# Patient Record
Sex: MALE | Race: WHITE | HISPANIC OR LATINO | Employment: FULL TIME | URBAN - METROPOLITAN AREA
[De-identification: names, ages, dates, MRNs, and addresses within clinical notes are randomized per-mention and may not be internally consistent; named-entity substitution may affect disease eponyms.]

---

## 2021-06-13 ENCOUNTER — HOSPITAL ENCOUNTER (EMERGENCY)
Facility: CLINIC | Age: 36
Discharge: HOME | End: 2021-06-13
Attending: EMERGENCY MEDICINE | Admitting: EMERGENCY MEDICINE

## 2021-06-13 VITALS
OXYGEN SATURATION: 99 % | DIASTOLIC BLOOD PRESSURE: 94 MMHG | HEART RATE: 98 BPM | TEMPERATURE: 99 F | SYSTOLIC BLOOD PRESSURE: 132 MMHG | WEIGHT: 183.64 LBS | RESPIRATION RATE: 14 BRPM

## 2021-06-13 DIAGNOSIS — H60.502 ACUTE OTITIS EXTERNA OF LEFT EAR, UNSPECIFIED TYPE: ICD-10-CM

## 2021-06-13 PROCEDURE — 99282 EMERGENCY DEPT VISIT SF MDM: CPT

## 2021-06-13 RX ORDER — NEOMYCIN SULFATE, POLYMYXIN B SULFATE, HYDROCORTISONE 3.5; 10000; 1 MG/ML; [USP'U]/ML; MG/ML
3 SOLUTION/ DROPS AURICULAR (OTIC) 4 TIMES DAILY
Qty: 6 ML | Refills: 0 | Status: SHIPPED | OUTPATIENT
Start: 2021-06-13 | End: 2021-06-23

## 2021-06-13 RX ORDER — CIPROFLOXACIN 500 MG/1
500 TABLET, FILM COATED ORAL 2 TIMES DAILY
Qty: 14 TABLET | Refills: 0 | Status: SHIPPED | OUTPATIENT
Start: 2021-06-13 | End: 2021-06-20

## 2021-06-13 NOTE — ED TRIAGE NOTES
A&O x4, ABCs intact. Pt presents with concern for ear infection. Pt states that his left ear is hurting and there is purulent drainage coming from it. Pt also swelling that feels that it is going into his teeth..

## 2021-06-13 NOTE — ED PROVIDER NOTES
History   Chief Complaint:  Otalgia       HPI   Yogi Scott is a 35 year old male who presents with left sided ear pain and yellow drainage since yesterday.  His right ear is normal.  He denies any trauma, fever, foreign body.  He does not swim and does not put water in excessively.  He denies any fevers or chills.  He denies any prior episodes of ear infection.    Review of Systems  Please see HPI. All other systems reviewed and negative.      Allergies:  The patient has no known allergies.     Medications:  The patient is not currently taking any prescribed medications.    Past Medical History:     No past medical history.    Social History:  Here with spouse    Physical Exam     Patient Vitals for the past 24 hrs:   BP Temp Temp src Pulse Resp SpO2 Weight   06/13/21 1616 (!) 132/94 99  F (37.2  C) Temporal 98 14 99 % 83.3 kg (183 lb 10.3 oz)       Physical Exam  Constitutional:       Appearance: He is well-developed.   HENT:      Right Ear: External ear normal.      Left Ear: Tympanic membrane normal.      Ears:      Comments: L EAC swollen with clear drainage, ear tender on exam with pain concentrated anteriorly. No pain over mastoids.      Mouth/Throat:      Mouth: Mucous membranes are moist.      Pharynx: Oropharynx is clear. No oropharyngeal exudate or posterior oropharyngeal erythema.   Eyes:      General: No scleral icterus.     Extraocular Movements: Extraocular movements intact.      Conjunctiva/sclera: Conjunctivae normal.      Pupils: Pupils are equal, round, and reactive to light.   Neck:      Musculoskeletal: Normal range of motion and neck supple.   Cardiovascular:      Rate and Rhythm: Normal rate and regular rhythm.      Heart sounds: Normal heart sounds. No murmur. No friction rub. No gallop.    Pulmonary:      Effort: Pulmonary effort is normal. No respiratory distress.      Breath sounds: Normal breath sounds. No wheezing or rales.   Lymphadenopathy:      Cervical: No cervical adenopathy.    Neurological:      Mental Status: He is alert.           Emergency Department Course     Emergency Department Course:    Reviewed:  I reviewed nursing notes and vitals    Assessments:  1717 I obtained history and examined the patient as noted above. I answered all questions prior to discharge.    Disposition:  The patient was discharged to home.       Impression & Plan       Medical Decision Making:  Yogi Scott is a 35 year old male who presents with an exam consistent with otitis externa.  There is no sign of mastoiditis, mass, dental abscess, or peritonsillar abscess. The patient will be started on ear drops and may take ibuprofen for pain.  Oral cirpo prescribed as well. Return if increasing pain, fever, hearing decrease or discharge.  Follow-up with ENT for reevaluation in 2-3 days      Diagnosis:    ICD-10-CM    1. Acute otitis externa of left ear, unspecified type  H60.502        Discharge Medications:  New Prescriptions    CIPROFLOXACIN (CIPRO) 500 MG TABLET    Take 1 tablet (500 mg) by mouth 2 times daily for 7 days    NEOMYCIN-POLYMYXIN-HYDROCORTISONE (CORTISPORIN) [id number] OTIC SOLUTION    Place 3 drops in ear(s) 4 times daily for 10 days       Scribe Disclosure:  SERAFIN, Arthur Escobar, am serving as a scribe at 5:17 PM on 6/13/2021 to document services personally performed by Kalia Childress MD based on my observations and the provider's statements to me.              Kalia Childress MD  06/14/21 0109

## 2021-06-13 NOTE — LETTER
June 13, 2021      To Whom It May Concern:      Yogi Scott was seen in our Emergency Department today, 06/13/21.  I expect his condition to improve over the next 1-2 days.  He may return to work when improved.    Sincerely,        Daisy Andrea RN

## 2021-06-13 NOTE — DISCHARGE INSTRUCTIONS
Start ear drops tonight asap  Start oral antibiotic too  Do not put anything in the ear unless it's the antibiotics  See ENT if symptoms do not improve

## 2023-02-20 ENCOUNTER — HOSPITAL ENCOUNTER (EMERGENCY)
Facility: CLINIC | Age: 38
Discharge: HOME | End: 2023-02-20
Attending: EMERGENCY MEDICINE | Admitting: EMERGENCY MEDICINE
Payer: OTHER MISCELLANEOUS

## 2023-02-20 ENCOUNTER — APPOINTMENT (OUTPATIENT)
Dept: CT IMAGING | Facility: CLINIC | Age: 38
End: 2023-02-20
Attending: EMERGENCY MEDICINE
Payer: OTHER MISCELLANEOUS

## 2023-02-20 VITALS
OXYGEN SATURATION: 99 % | WEIGHT: 175 LBS | RESPIRATION RATE: 16 BRPM | SYSTOLIC BLOOD PRESSURE: 140 MMHG | HEART RATE: 90 BPM | TEMPERATURE: 97.8 F | DIASTOLIC BLOOD PRESSURE: 72 MMHG

## 2023-02-20 DIAGNOSIS — S02.40EA: ICD-10-CM

## 2023-02-20 DIAGNOSIS — S02.85XA CLOSED FRACTURE OF RIGHT ORBIT, INITIAL ENCOUNTER (H): ICD-10-CM

## 2023-02-20 PROCEDURE — 250N000013 HC RX MED GY IP 250 OP 250 PS 637: Performed by: EMERGENCY MEDICINE

## 2023-02-20 PROCEDURE — 70450 CT HEAD/BRAIN W/O DYE: CPT

## 2023-02-20 PROCEDURE — 70486 CT MAXILLOFACIAL W/O DYE: CPT

## 2023-02-20 PROCEDURE — 99284 EMERGENCY DEPT VISIT MOD MDM: CPT | Mod: 25

## 2023-02-20 RX ORDER — OXYCODONE AND ACETAMINOPHEN 5; 325 MG/1; MG/1
2 TABLET ORAL ONCE
Status: COMPLETED | OUTPATIENT
Start: 2023-02-20 | End: 2023-02-20

## 2023-02-20 RX ORDER — OXYCODONE AND ACETAMINOPHEN 5; 325 MG/1; MG/1
1-2 TABLET ORAL EVERY 6 HOURS PRN
Qty: 16 TABLET | Refills: 0 | Status: SHIPPED | OUTPATIENT
Start: 2023-02-20

## 2023-02-20 RX ADMIN — OXYCODONE HYDROCHLORIDE AND ACETAMINOPHEN 2 TABLET: 5; 325 TABLET ORAL at 16:58

## 2023-02-20 ASSESSMENT — ENCOUNTER SYMPTOMS
ARTHRALGIAS: 1
EYE REDNESS: 1
HEADACHES: 1

## 2023-02-20 ASSESSMENT — ACTIVITIES OF DAILY LIVING (ADL)
ADLS_ACUITY_SCORE: 35
ADLS_ACUITY_SCORE: 33

## 2023-02-20 NOTE — ED TRIAGE NOTES
"    Pt reports that he had slipped on ice this morning and hit the right side of head, pt has redness noted to right eye and reports that he feels like \"my head is squishing in\" PT reports positive LOC for about 5 minutes per pt. PT denies pain with palpation to Cspine and Befast negative at this time. VSS and ABC's intact  "

## 2023-02-20 NOTE — ED PROVIDER NOTES
"  History     Chief Complaint:  Fall     HPI   Yogi Scott is a 37 year old male, otherwise healthy, who presents with pain to his right-sided head pain and right eye redness, sustained earlier this morning when he had a mechanical fall on ice. Patient states he slipped on ice and landed on the right side of his head. Per patient, he had loss of consciousness for approximately 5 minutes. He now reports ongoing headache like \"my head is squishing in.\" No other trauma/complaints of other pain at this time.  Denies any vision changes.  Denies any neck pain.    Independent Historian:   Wife, also acting as  per patient request.    Review of External Notes: None    ROS:  Review of Systems   Eyes: Positive for redness (right).   Musculoskeletal: Positive for arthralgias (right-sided head).   Neurological: Positive for headaches.        (+) loss of consciousness   All other systems reviewed and are negative.    Allergies:  No Known Drug Allergies     Medications:    The patient takes no daily medication.    Past Medical History:    The patient has no pertinent past medical history.    Social History:  The patient presented with his wife.  The patient arrived in a private vehicle.  PCP: No Ref-Primary, Physician     Physical Exam     Patient Vitals for the past 24 hrs:   BP Temp Temp src Pulse Resp SpO2 Weight   02/20/23 1719 (!) 140/72 -- -- 90 16 99 % --   02/20/23 1044 (!) 145/87 97.8  F (36.6  C) Temporal 100 18 100 % 79.4 kg (175 lb)      Physical Exam  Vitals and nursing note reviewed.   Constitutional:       General: He is not in acute distress.     Appearance: He is not ill-appearing.   HENT:      Head: Normocephalic. Contusion (Right facial) present. No laceration.      Jaw: There is normal jaw occlusion.      Right Ear: External ear normal.      Left Ear: External ear normal.      Nose: Nose normal. No nasal deformity.      Mouth/Throat:      Mouth: Mucous membranes are moist. No injury. "   Eyes:      Extraocular Movements: Extraocular movements intact.      Conjunctiva/sclera:      Right eye: Hemorrhage present.      Pupils: Pupils are equal, round, and reactive to light.      Slit lamp exam:     Right eye: No hyphema.   Pulmonary:      Effort: Pulmonary effort is normal. No respiratory distress.   Chest:      Chest wall: No tenderness.   Abdominal:      Tenderness: There is no abdominal tenderness.   Musculoskeletal:         General: No deformity or signs of injury.      Cervical back: Normal range of motion and neck supple. No tenderness.   Skin:     General: Skin is warm and dry.      Findings: No rash.   Neurological:      Mental Status: He is alert and oriented to person, place, and time.      Cranial Nerves: No cranial nerve deficit.      Sensory: Sensation is intact.      Motor: No weakness.   Psychiatric:         Behavior: Behavior normal.           Emergency Department Course   Imaging:  Head CT w/o contrast   Final Result   IMPRESSION:    1. Facial bone fractures that are detailed on the accompanying facial   bone CT.   2. Otherwise, normal head CT.      Radiation dose for this scan was reduced using automated exposure   control, adjustment of the mA and/or kV according to patient size, or   iterative reconstruction technique.        MARY SHEPHERD MD            SYSTEM ID:  [id number]      CT Facial Bones without Contrast   Final Result   IMPRESSION:   1. Zygomaticofacial complex fractures on the right.   2. Nondisplaced fracture of the right orbital floor.      Radiation dose for this scan was reduced using automated exposure   control, adjustment of the mA and/or kV according to patient size, or   iterative reconstruction technique.       MARY SHEPHERD MD            SYSTEM ID:  [id number]         Report per radiology    Emergency Department Course & Assessments:     Interventions:  Medications   oxyCODONE-acetaminophen (PERCOCET) 5-325 MG per tablet 2 tablet (2 tablets Oral Given 2/20/23  1658)      Independent Interpretation (X-rays, CTs, rhythm strip):  None    Consultations/Discussion of Management or Tests:  ED Course as of 23   1648 I consulted with Dr. Duran for facial trauma regarding the patient's history and presentation here in the emergency department.     Social Determinants of Health affecting care:   Language barrier    Assessments:  1626 I obtained history and examined the patient as noted above. I explained findings.  1711 I rechecked the patient. I believe that they are safe for discharge at this time.    Disposition:  The patient was discharged to home.     Impression & Plan      Medical Decision Makin-year-old male presenting with a right facial injury after slip and fall on the ice.  He denies any injuries to other parts of his body.  Denies any neck pain and has no tenderness on exam.  CT of the head does not show any signs of an acute intracranial bleed or skull fracture.  However, CT of the facial bones shows multiple facial fractures as noted above.  Patient's GCS is 15 and is neurologically intact.  He does not have any signs of extraocular muscle entrapment.  He denies any visual changes.  Patient was given 2 Percocet for pain.  I discussed with Dr. Duran with ENT at the .  She advises the patient can be safely discharged but recommends that the patient follow-up in their clinic this week.  She advised nose blowing precautions.  Discussed this with patient and discussed return precautions.    Diagnosis:    ICD-10-CM    1. Zygomatic fracture, right side, initial encounter for closed fracture (H)  S02.40EA       2. Closed fracture of right orbit, initial encounter (H)  S02.85XA          Discharge Medications:  Discharge Medication List as of 2023  5:11 PM      START taking these medications    Details   oxyCODONE-acetaminophen (PERCOCET) 5-325 MG tablet Take 1-2 tablets by mouth every 6 hours as needed for severe pain (7-10), Disp-16  tablet, R-0, E-Prescribe            Scribe Disclosure:  I, Shanon Medinaalisha, am serving as a scribe at 4:19 PM on 2/20/2023 to document services personally performed by Neno Segura MD based on my observations and the provider's statements to me.     2/20/2023   Neno Segura MD Goodwin, Shaun M, MD  02/20/23 2054

## 2023-02-21 NOTE — TELEPHONE ENCOUNTER
FUTURE VISIT INFORMATION      FUTURE VISIT INFORMATION:    Date: 2/28/23    Time: 2:40pm    Location: [facility] [place]  REFERRAL INFORMATION:    Referring provider:  Neno Segura MD    Referring providers clinic:  [facility] ED    Reason for visit/diagnosis  zygoma/orbital/maxilla - Referred by inpatient ED    RECORDS REQUESTED FROM:       Clinic name Comments Records Status Imaging Status   [facility] ED 2/20/23- note with Neno Segura MD Epic     Imaging  2/20/23- ct head   2/20/21- ct facial bone  Epic  PACS

## 2023-02-28 ENCOUNTER — PRE VISIT (OUTPATIENT)
Dept: OTOLARYNGOLOGY | Facility: CLINIC | Age: 38
End: 2023-02-28

## 2023-02-28 ENCOUNTER — OFFICE VISIT (OUTPATIENT)
Dept: OTOLARYNGOLOGY | Facility: CLINIC | Age: 38
End: 2023-02-28
Payer: OTHER MISCELLANEOUS

## 2023-02-28 VITALS
HEART RATE: 93 BPM | HEIGHT: 62 IN | DIASTOLIC BLOOD PRESSURE: 83 MMHG | SYSTOLIC BLOOD PRESSURE: 133 MMHG | BODY MASS INDEX: 31.65 KG/M2 | WEIGHT: 172 LBS

## 2023-02-28 DIAGNOSIS — S02.40EA CLOSED FRACTURE OF RIGHT ZYGOMATIC ARCH, INITIAL ENCOUNTER (H): Primary | ICD-10-CM

## 2023-02-28 PROCEDURE — 99203 OFFICE O/P NEW LOW 30 MIN: CPT | Performed by: OTOLARYNGOLOGY

## 2023-02-28 ASSESSMENT — PAIN SCALES - GENERAL: PAINLEVEL: NO PAIN (0)

## 2023-02-28 NOTE — PATIENT INSTRUCTIONS
"You were seen in the clinic today by Dr. Staton. If you have any questions or concerns after your appointment, please call the clinic at [phone]. Press \"1\" for scheduling, press \"3\" for nurse advice.    2.   The following has been recommended for you based upon your appointment today:   -Plan to call the clinic in the next week if you decide you would like to schedule surgery.    Kathleen BARCENAS, RN  [facility]  Department of Otolaryngology  [phone]      "

## 2023-02-28 NOTE — PROGRESS NOTES
"  Otolaryngology Clinic      Name: Yogi Scott  MRN: [id number]  Age: 37 year old  : 1985  Referring provider: Referred Self  2023      Chief Complaint:  Consultation    History of Present Illness:   Yogi Scott is a 37 year old male who presents for consultation regarding zygomatic fracture. The patient presented to the ED on 23 with right-sided head pain and right eye redness after a mechanical fall on ice, resulting in loss of consciousness. A CT head was obtained and revealed multiple facial fractures.     Today, he reports that his vision is a bit foggy on the right. Denies any double vision. Additionally, his face was sunken in, but now it is much better. Patient also endorses numbness as well as difficulty chewing and eating.    Active Medications:     Current Outpatient Medications:      oxyCODONE-acetaminophen (PERCOCET) 5-325 MG tablet, Take 1-2 tablets by mouth every 6 hours as needed for severe pain (7-10), Disp: 16 tablet, Rfl: 0      Allergies:   Patient has no known allergies.      Past Medical History:  No past medical history on file.  There is no problem list on file for this patient.       Past Surgical History:  No past surgical history on file.    Family History:   No family history on file.      Social History:        Review of Systems:   Pertinent items are noted in HPI or as in patient entered ROS below, remainder of complete ROS is negative.    ENT ROS 2023   Constitutional: Weight loss   Neurology: Headache   Eyes: Visual loss   Ears, Nose, Throat: Ringing/noise in ears   Musculoskeletal: Back pain         Physical Exam:   /83 (BP Location: Right arm, Patient Position: Sitting, Cuff Size: Adult Regular)   Pulse 93   Ht 1.575 m (5' 2\")   Wt 78 kg (172 lb)   BMI 31.46 kg/m       Constitutional:  The patient was accompanied, well-groomed, and in no acute distress.    Skin:  Warm and pink.    Neurologic:  Alert and oriented x 3.  CN's III-XII within " normal limits.  Voice normal.   Psychiatric:  The patient's affect was calm, cooperative, and appropriate.    Respiratory:  Breathing comfortably without stridor or exertion of accessory muscles.    Eyes: Extraocular movement intact. Right eye cloudiness.  Head:  Normocephalic and atraumatic.  No lesions or scars. Slight depression of zygoma on right anteriorly.   Ears:  Pinnae and tragus non-tender.  EAC's and TM's were clear.  Nose:  Sinuses were non-tender.  Anterior rhinoscopy revealed midline septum and absence of purulence or polyps.    OC/OP:  Normal tongue, floor of mouth, buccal mucosa, and palate.  No lesions or masses on inspection or palpation.  No abnormal lymph tissue in the oropharynx.  The pterygoid region is non-tender.    Neck:  Supple with normal laryngeal and tracheal landmarks.  The parotid beds were without masses.  No palpable thyroid.  Lymphatic:  There is no palpable lymphadenopathy in the neck.     Assessment and Plan:  Yogi Scott is a 37 year old male who presents for consultation regarding zygomatic fracture. On exam, there is a slight depression of the zygoma anteriorly. I explained that the patient can certainly pursue surgery for cosmetic reasons, but it is not necessary as the fracture would not have a longstanding impact on his ability to function. I also reassured him that his numbness, visual disturbance, and muscle soreness will resolve with time. Patient will take some time to consider his options and reach out should he elect to proceed with surgery.      Scribe Disclosure:  I, Almaz Muas, am serving as a scribe to document services personally performed by Travis Staton MD at this visit, based upon the provider's statements to me. All documentation has been reviewed by the aforementioned provider prior to being entered into the official medical record.

## 2023-02-28 NOTE — LETTER
2023     RE: Yogi Scott  [address]  [place] [address]     Dear Colleague,    Thank you for referring your patient, Yogi Scott, to the [facility] EAR NOSE AND THROAT CLINIC [place] [facility]. Please see a copy of my visit note below.      Otolaryngology Clinic      Name: Yogi Scott  MRN: [id number]  Age: 37 year old  : 1985  Referring provider: Referred Self  2023      Chief Complaint:  Consultation    History of Present Illness:   Yogi Scott is a 37 year old male who presents for consultation regarding zygomatic fracture. The patient presented to the ED on 23 with right-sided head pain and right eye redness after a mechanical fall on ice, resulting in loss of consciousness. A CT head was obtained and revealed multiple facial fractures.     Today, he reports that his vision is a bit foggy on the right. Denies any double vision. Additionally, his face was sunken in, but now it is much better. Patient also endorses numbness as well as difficulty chewing and eating.    Active Medications:     Current Outpatient Medications:      oxyCODONE-acetaminophen (PERCOCET) 5-325 MG tablet, Take 1-2 tablets by mouth every 6 hours as needed for severe pain (7-10), Disp: 16 tablet, Rfl: 0      Allergies:   Patient has no known allergies.      Past Medical History:  No past medical history on file.  There is no problem list on file for this patient.       Past Surgical History:  No past surgical history on file.    Family History:   No family history on file.      Social History:        Review of Systems:   Pertinent items are noted in HPI or as in patient entered ROS below, remainder of complete ROS is negative.    ENT ROS 2023   Constitutional: Weight loss   Neurology: Headache   Eyes: Visual loss   Ears, Nose, Throat: Ringing/noise in ears   Musculoskeletal: Back pain         Physical Exam:   /83 (BP  "Location: Right arm, Patient Position: Sitting, Cuff Size: Adult Regular)   Pulse 93   Ht 1.575 m (5' 2\")   Wt 78 kg (172 lb)   BMI 31.46 kg/m       Constitutional:  The patient was accompanied, well-groomed, and in no acute distress.    Skin:  Warm and pink.    Neurologic:  Alert and oriented x 3.  CN's III-XII within normal limits.  Voice normal.   Psychiatric:  The patient's affect was calm, cooperative, and appropriate.    Respiratory:  Breathing comfortably without stridor or exertion of accessory muscles.    Eyes: Extraocular movement intact. Right eye cloudiness.  Head:  Normocephalic and atraumatic.  No lesions or scars. Slight depression of zygoma on right anteriorly.   Ears:  Pinnae and tragus non-tender.  EAC's and TM's were clear.  Nose:  Sinuses were non-tender.  Anterior rhinoscopy revealed midline septum and absence of purulence or polyps.    OC/OP:  Normal tongue, floor of mouth, buccal mucosa, and palate.  No lesions or masses on inspection or palpation.  No abnormal lymph tissue in the oropharynx.  The pterygoid region is non-tender.    Neck:  Supple with normal laryngeal and tracheal landmarks.  The parotid beds were without masses.  No palpable thyroid.  Lymphatic:  There is no palpable lymphadenopathy in the neck.     Assessment and Plan:  Yogi Scott is a 37 year old male who presents for consultation regarding zygomatic fracture. On exam, there is a slight depression of the zygoma anteriorly. I explained that the patient can certainly pursue surgery for cosmetic reasons, but it is not necessary as the fracture would not have a longstanding impact on his ability to function. I also reassured him that his numbness, visual disturbance, and muscle soreness will resolve with time. Patient will take some time to consider his options and reach out should he elect to proceed with surgery.      Scribe Disclosure:  I, Almaz Musa, am serving as a scribe to document services personally performed " by Travis Staton MD at this visit, based upon the provider's statements to me. All documentation has been reviewed by the aforementioned provider prior to being entered into the official medical record.     Again, thank you for allowing me to participate in the care of your patient.      Sincerely,    Travis Staton MD

## 2023-02-28 NOTE — NURSING NOTE
"Chief Complaint   Patient presents with     Consult       Blood pressure 133/83, pulse 93, height 1.575 m (5' 2\"), weight 78 kg (172 lb).    Luz Maria Cortes, EMT    "

## 2023-02-28 NOTE — LETTER
[facility] EAR NOSE AND THROAT CLINIC [address]  [address]  [facility] [address]  [phone]          February 28, 2023    RE:  Yogi Scott                                                                                                                                                       [id number] [facility] [address]            To whom it may concern:    Yogi Scott is under my professional care for an facial fracture. He was seen in my clinic today 02/28/2023.    Sincerely,        Travis Staton MD